# Patient Record
Sex: FEMALE
[De-identification: names, ages, dates, MRNs, and addresses within clinical notes are randomized per-mention and may not be internally consistent; named-entity substitution may affect disease eponyms.]

---

## 2017-08-23 ENCOUNTER — HOSPITAL ENCOUNTER (OUTPATIENT)
Dept: HOSPITAL 53 - M SMT | Age: 37
End: 2017-08-23
Attending: ADVANCED PRACTICE MIDWIFE
Payer: OTHER GOVERNMENT

## 2017-08-23 DIAGNOSIS — Z34.82: Primary | ICD-10-CM

## 2017-08-23 DIAGNOSIS — Z3A.00: ICD-10-CM

## 2017-08-23 DIAGNOSIS — Z36: ICD-10-CM

## 2017-08-23 LAB
ALT SERPL W P-5'-P-CCNC: 21 U/L (ref 12–78)
AST SERPL-CCNC: 11 U/L (ref 15–37)
BASOPHILS # BLD AUTO: 0.1 K/MM3 (ref 0–0.2)
BASOPHILS NFR BLD AUTO: 0.7 % (ref 0–1)
BILIRUB SERPL-MCNC: 0.2 MG/DL (ref 0.2–1)
CREAT SERPL-MCNC: 0.57 MG/DL (ref 0.55–1.02)
EOSINOPHIL # BLD AUTO: 0.9 K/MM3 (ref 0–0.5)
EOSINOPHIL NFR BLD AUTO: 9.6 % (ref 0–3)
ERYTHROCYTE [DISTWIDTH] IN BLOOD BY AUTOMATED COUNT: 13.9 % (ref 11.5–14.5)
GFR SERPL CREATININE-BSD FRML MDRD: > 60 ML/MIN/{1.73_M2} (ref 60–?)
LARGE UNSTAINED CELL #: 0.2 K/MM3 (ref 0–0.4)
LARGE UNSTAINED CELL %: 2.1 % (ref 0–4)
LYMPHOCYTES # BLD AUTO: 2.3 K/MM3 (ref 1.5–4.5)
LYMPHOCYTES NFR BLD AUTO: 25.8 % (ref 24–44)
MCH RBC QN AUTO: 27.1 PG (ref 27–33)
MCHC RBC AUTO-ENTMCNC: 32.6 G/DL (ref 32–36.5)
MCV RBC AUTO: 83.2 FL (ref 80–96)
MONOCYTES # BLD AUTO: 0.4 K/MM3 (ref 0–0.8)
MONOCYTES NFR BLD AUTO: 4.6 % (ref 0–5)
NEUTROPHILS # BLD AUTO: 5.2 K/MM3 (ref 1.8–7.7)
NEUTROPHILS NFR BLD AUTO: 57.2 % (ref 36–66)
PLATELET # BLD AUTO: 417 K/MM3 (ref 150–450)
URATE SERPL-MCNC: 3.3 MG/DL (ref 2.6–6)
WBC # BLD AUTO: 9 K/MM3 (ref 4–10)

## 2017-09-13 ENCOUNTER — HOSPITAL ENCOUNTER (OUTPATIENT)
Dept: HOSPITAL 53 - M RAD | Age: 37
End: 2017-09-13
Attending: ADVANCED PRACTICE MIDWIFE
Payer: OTHER GOVERNMENT

## 2017-09-13 DIAGNOSIS — Z36: ICD-10-CM

## 2017-09-13 DIAGNOSIS — Z3A.19: ICD-10-CM

## 2017-09-13 NOTE — REP
Clinical:  Anatomical evaluation.

 

Comparison: None .

 

Findings:

Examination demonstrates a single live intrauterine pregnancy in breech

presentation.  Fetal motion is identified by technologist.  Placenta is noted

anterior fundal and grade zero without evidence for placenta previa or abruption.

Amniotic fluid volume is normal.  Cervix measures 3.8 cm in length and appears

closed.  No evidence for nuchal cord.

 

Gestational age by LMP 19 weeks 4-day with PAULA 02/03/2018 .

Gestational age by current measurements 19 weeks 5 days with PAULA 02/02/2018 .

 

FHR equals 153 beats per minute.

BPD  4.5 cm     19 weeks 3 days

HC  17.3 cm     19 weeks 6 days

AC  14.7 cm     20 weeks 0 days

FL  3.2 cm      19 weeks 6 days

HL  2.9 cm      19 weeks 4 days

HC/AC ratio  1.18

 

Estimated fetal weight 320 grams ( 57th percentile).

 

Anatomical assessment demonstrates normal structures including cranium, choroid

plexus, cavum, cerebellum/posterior fossa, facial profile, lungs, four-chamber

heart/ left ventricular outflow tract, diaphragm, stomach, cord

insertion/three-vessel cord, kidneys/bladder, spine, and extremities.

 

Limited evaluation of the nose and lips and right cardiac ventricular outflow

tract as well as mild subjective renal pelviectasis which is within normal

range.

 

Impression:

Single live intrauterine pregnancy in breech presentation demonstrating

appropriate interval growth.  Anatomical limitations as described above.  The

remainder of the anatomical assessment is complete and normal.

 

 

Signed by

Fran Mclain MD 09/13/2017 02:48 P

## 2017-10-02 ENCOUNTER — HOSPITAL ENCOUNTER (OUTPATIENT)
Dept: HOSPITAL 53 - M RAD | Age: 37
End: 2017-10-02
Attending: ADVANCED PRACTICE MIDWIFE
Payer: OTHER GOVERNMENT

## 2017-10-02 DIAGNOSIS — Z36.2: Primary | ICD-10-CM

## 2017-10-02 NOTE — REP
Obstetric sonography:

 

History:  Supervision of pregnancy, followup anatomy.

 

Findings:  Scanning through the gravid uterus demonstrates a viable single

intrauterine gestation in a transverse head to the maternal left fetal lie.

Fetal motion is observed and heart rate is recorded at 147 beats per minute.  An

anterior grade 1 placenta is seen without evidence of previa or abruption.

Amniotic fluid is subjectively normal.  Closed cervical length is 6.1 cm.  This

is measured transabdominally.  There has been appropriate interval growth.  The

umbilical cord is seen draping across the fetal shoulders and neck region.

 

No fetal abnormality is noted.  Cross section of the umbilical cord is less than

optimally seen today.  Fetal spine is less than optimally seen today due to fetal

position, but these structures were identified previously.  The following

additional fetal anatomic structures are identified today and felt to be

sonographically unremarkable:  Fetal cranium, cavum, cerebellum and posterior

fossa, face and profile, lungs, four-chamber heart with left and right

ventricular outflow tract views, diaphragm, left-sided stomach, abdominal wall

cord insertion, kidneys and bladder, upper and lower extremities.

 

Biometry chart:

 

BPD 5.3 cm = 22 weeks 0 days

 

HC 20.5 cm = 22 weeks 4 days

 

AC 18.5 cm = 23 weeks 2 days

 

FL 3.9 cm = 22 weeks 3 days

 

HL 3.5 cm = 22 weeks 0 days

 

HC/AC ratio normal 1.11.

 

Cephalic index normal 0.70.

 

Estimated fetal weight 539 grams 1 pound 2 ounces 62nd percentile for 22 weeks 2

days.

 

Impression:

 

Viable single intrauterine gestation at 22 weeks 3 days by today's composite

criteria.  Expected gestational age estimate based on prior sonography is also 22

weeks 3 days.  PAULA by prior sonography February 2, 2018.  In combination with the

previous study, fetal anatomic survey is felt to be complete.

 

 

Signed by

Kaden Gonzalez MD 10/03/2017 09:29 A

## 2017-10-05 ENCOUNTER — HOSPITAL ENCOUNTER (OUTPATIENT)
Dept: HOSPITAL 53 - M LDO | Age: 37
Discharge: HOME | End: 2017-10-05
Attending: OBSTETRICS & GYNECOLOGY
Payer: OTHER GOVERNMENT

## 2017-10-05 VITALS — SYSTOLIC BLOOD PRESSURE: 116 MMHG | DIASTOLIC BLOOD PRESSURE: 65 MMHG

## 2017-10-05 VITALS — WEIGHT: 172.4 LBS | BODY MASS INDEX: 30.55 KG/M2 | HEIGHT: 63 IN

## 2017-10-05 VITALS — SYSTOLIC BLOOD PRESSURE: 107 MMHG | DIASTOLIC BLOOD PRESSURE: 68 MMHG

## 2017-10-05 VITALS — SYSTOLIC BLOOD PRESSURE: 127 MMHG | DIASTOLIC BLOOD PRESSURE: 78 MMHG

## 2017-10-05 DIAGNOSIS — O99.89: Primary | ICD-10-CM
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
## 2017-10-05 DIAGNOSIS — Z3A.22: ICD-10-CM

## 2017-11-15 ENCOUNTER — HOSPITAL ENCOUNTER (OUTPATIENT)
Dept: HOSPITAL 53 - M SMT | Age: 37
End: 2017-11-15
Attending: ADVANCED PRACTICE MIDWIFE
Payer: OTHER GOVERNMENT

## 2017-11-15 DIAGNOSIS — O09.513: Primary | ICD-10-CM

## 2017-11-15 LAB
BASOPHILS # BLD AUTO: 0.1 10^3/UL (ref 0–0.2)
BASOPHILS NFR BLD AUTO: 0.9 % (ref 0–1)
EOSINOPHIL # BLD AUTO: 0.5 10^3/UL (ref 0–0.5)
EOSINOPHIL NFR BLD AUTO: 6.3 % (ref 0–3)
ERYTHROCYTE [DISTWIDTH] IN BLOOD BY AUTOMATED COUNT: 14.6 % (ref 11.5–14.5)
IMM GRANULOCYTES NFR BLD: 0.9 % (ref 0–0)
LYMPHOCYTES # BLD AUTO: 2.3 10^3/UL (ref 1.5–4.5)
LYMPHOCYTES NFR BLD AUTO: 26.6 % (ref 24–44)
MCH RBC QN AUTO: 25.1 PG (ref 27–33)
MCHC RBC AUTO-ENTMCNC: 31.8 G/DL (ref 32–36.5)
MCV RBC AUTO: 78.9 FL (ref 80–96)
MONOCYTES # BLD AUTO: 0.5 10^3/UL (ref 0–0.8)
MONOCYTES NFR BLD AUTO: 6.2 % (ref 0–5)
NEUTROPHILS # BLD AUTO: 5 10^3/UL (ref 1.8–7.7)
NEUTROPHILS NFR BLD AUTO: 59.1 % (ref 36–66)
NRBC BLD AUTO-RTO: 0 % (ref 0–0)
PLATELET # BLD AUTO: 380 10^3/UL (ref 150–450)
WBC # BLD AUTO: 8.5 10^3/UL (ref 4–10)

## 2017-11-24 ENCOUNTER — HOSPITAL ENCOUNTER (OUTPATIENT)
Dept: HOSPITAL 53 - M LAB | Age: 37
End: 2017-11-24
Attending: ADVANCED PRACTICE MIDWIFE
Payer: OTHER GOVERNMENT

## 2017-11-24 DIAGNOSIS — O09.523: Primary | ICD-10-CM

## 2017-11-29 ENCOUNTER — HOSPITAL ENCOUNTER (OUTPATIENT)
Dept: HOSPITAL 53 - M LAB REF | Age: 37
End: 2017-11-29
Attending: ADVANCED PRACTICE MIDWIFE
Payer: OTHER GOVERNMENT

## 2017-11-29 DIAGNOSIS — O09.523: Primary | ICD-10-CM

## 2017-12-28 ENCOUNTER — HOSPITAL ENCOUNTER (OUTPATIENT)
Dept: HOSPITAL 53 - M SMT | Age: 37
End: 2017-12-28
Payer: OTHER GOVERNMENT

## 2017-12-28 DIAGNOSIS — O99.013: Primary | ICD-10-CM

## 2017-12-28 LAB
MEAN CORPUSCULAR HEMOGLOBIN: 23.1 PG (ref 27–33)
MEAN CORPUSCULAR HGB CONC: 31.1 G/DL (ref 32–36.5)
MEAN CORPUSCULAR VOLUME: 74.4 FL (ref 80–96)
NRBC BLD AUTO-RTO: 0 % (ref 0–0)
PLATELET COUNT, AUTOMATED: 400 10^3/UL (ref 150–450)
RED CELL DISTRIBUTION WIDTH: 15.8 % (ref 11.5–14.5)
WHITE BLOOD COUNT: 7.4 10^3/UL (ref 4–10)

## 2018-01-19 ENCOUNTER — HOSPITAL ENCOUNTER (INPATIENT)
Dept: HOSPITAL 53 - M LDO | Age: 38
LOS: 4 days | Discharge: HOME | End: 2018-01-23
Attending: OBSTETRICS & GYNECOLOGY | Admitting: OBSTETRICS & GYNECOLOGY
Payer: COMMERCIAL

## 2018-01-19 DIAGNOSIS — D64.9: ICD-10-CM

## 2018-01-19 DIAGNOSIS — Z3A.37: ICD-10-CM

## 2018-01-19 LAB
ALT SERPL W P-5'-P-CCNC: 13 U/L (ref 12–78)
AST SERPL-CCNC: 22 U/L (ref 7–37)
BILIRUBIN,TOTAL: 0.3 MG/DL (ref 0.2–1)
CREATININE FOR GFR: 0.45 MG/DL (ref 0.55–1.02)
CREATININE,RANDOM URINE: 48.2 MG/DL
GFR SERPL CREATININE-BSD FRML MDRD: > 60 ML/MIN/{1.73_M2} (ref 60–?)
HEMATOCRIT: 29.5 % (ref 36–47)
HEMOGLOBIN: 9.3 G/DL (ref 12–16)
LDH SERPL L TO P-CCNC: 270 U/L (ref 84–246)
MEAN CORPUSCULAR HEMOGLOBIN: 22 PG (ref 27–33)
MEAN CORPUSCULAR HGB CONC: 31.5 G/DL (ref 32–36.5)
MEAN CORPUSCULAR VOLUME: 69.9 FL (ref 80–96)
NRBC BLD AUTO-RTO: 0 % (ref 0–0)
PLATELET COUNT, AUTOMATED: 358 10^3/UL (ref 150–450)
RED BLOOD COUNT: 4.22 10^6/UL (ref 4–5.4)
RED CELL DISTRIBUTION WIDTH: 16.8 % (ref 11.5–14.5)
TOTAL PROTEIN,RANDOM URINE: 13.8 MG/DL (ref 0–12)
URIC ACID: 3.1 MG/DL (ref 2.6–6)
WHITE BLOOD COUNT: 8.3 10^3/UL (ref 4–10)

## 2018-01-19 RX ADMIN — DEXTROSE MONOHYDRATE 1 MLS/HR: 5 INJECTION INTRAVENOUS at 20:24

## 2018-01-19 RX ADMIN — SODIUM CHLORIDE, POTASSIUM CHLORIDE, SODIUM LACTATE AND CALCIUM CHLORIDE 1 ML: 600; 310; 30; 20 INJECTION, SOLUTION INTRAVENOUS at 20:24

## 2018-01-19 RX ADMIN — ACETAMINOPHEN 1 MG: 500 TABLET ORAL at 20:23

## 2018-01-19 RX ADMIN — MISOPROSTOL 1 MCG: 100 TABLET ORAL at 20:23

## 2018-01-20 LAB
CORD GAS ABE A: -5.1
CORD GAS ABE V: -3.6
CORD GAS HCO3 A: 21.1 MEQ/L
CORD GAS HCO3 V: 23.4 MEQ/L
CORD GAS O2 SAT A: 57.4 %
CORD GAS O2 SAT V: 62.6 %
CORD GAS PCO2 A: 42.9 MMHG
CORD GAS PCO2 V: 49.3 MMHG
CORD GAS PH A: 7.31 UNITS
CORD GAS PH V: 7.29 UNITS
CORD GAS PO2 A: 25.5 MMHG
CORD GAS PO2 V: 26.3 MMHG
CORD GAS SBC A: 19.4 MEQ/L
CORD GAS SBC V: 20.6 MEQ/L
CORD GAS TCO2 A: 22.4 MEQ/L
CORD GAS TCO2 V: 25 MEQ/L
HEMATOCRIT: 24.5 % (ref 36–47)
HEMOGLOBIN: 7.8 G/DL (ref 12–16)
IMMEDIATE SPIN CROSSMATCH: 1 3
MEAN CORPUSCULAR HEMOGLOBIN: 21.8 PG (ref 27–33)
MEAN CORPUSCULAR HGB CONC: 31.8 G/DL (ref 32–36.5)
MEAN CORPUSCULAR VOLUME: 68.6 FL (ref 80–96)
NRBC BLD AUTO-RTO: 0 % (ref 0–0)
PLATELET COUNT, AUTOMATED: 208 10^3/UL (ref 150–450)
RED BLOOD COUNT: 3.57 10^6/UL (ref 4–5.4)
RED CELL DISTRIBUTION WIDTH: 16.6 % (ref 11.5–14.5)
WHITE BLOOD COUNT: 7.8 10^3/UL (ref 4–10)

## 2018-01-20 PROCEDURE — 10907ZC DRAINAGE OF AMNIOTIC FLUID, THERAPEUTIC FROM PRODUCTS OF CONCEPTION, VIA NATURAL OR ARTIFICIAL OPENING: ICD-10-PCS

## 2018-01-20 PROCEDURE — 3E0P7GC INTRODUCTION OF OTHER THERAPEUTIC SUBSTANCE INTO FEMALE REPRODUCTIVE, VIA NATURAL OR ARTIFICIAL OPENING: ICD-10-PCS

## 2018-01-20 PROCEDURE — 30253N1: ICD-10-PCS

## 2018-01-20 RX ADMIN — KETOROLAC TROMETHAMINE 1 MG: 30 INJECTION, SOLUTION INTRAMUSCULAR at 22:49

## 2018-01-20 RX ADMIN — Medication 1 MLS/HR: at 13:24

## 2018-01-20 RX ADMIN — ONDANSETRON 1 MG: 2 INJECTION INTRAMUSCULAR; INTRAVENOUS at 10:13

## 2018-01-20 RX ADMIN — Medication 1 MLS/HR: at 00:30

## 2018-01-20 RX ADMIN — MISOPROSTOL 1 MCG: 200 TABLET ORAL at 21:16

## 2018-01-20 RX ADMIN — Medication 1 MLS/HR: at 00:42

## 2018-01-20 RX ADMIN — Medication 1 MLS/HR: at 09:01

## 2018-01-20 RX ADMIN — LOPERAMIDE HYDROCHLORIDE 1 MG: 2 TABLET, FILM COATED ORAL at 21:06

## 2018-01-20 RX ADMIN — Medication 1 MLS/HR: at 04:30

## 2018-01-20 RX ADMIN — DOCUSATE SODIUM 1 MG: 100 CAPSULE, LIQUID FILLED ORAL at 21:00

## 2018-01-20 RX ADMIN — SODIUM CHLORIDE, POTASSIUM CHLORIDE, SODIUM LACTATE AND CALCIUM CHLORIDE 1 MLS/HR: 600; 310; 30; 20 INJECTION, SOLUTION INTRAVENOUS at 21:06

## 2018-01-21 LAB
HEMATOCRIT: 27.3 % (ref 36–47)
HEMOGLOBIN: 9 G/DL (ref 12–16)
MEAN CORPUSCULAR HEMOGLOBIN: 23.7 PG (ref 27–33)
MEAN CORPUSCULAR HGB CONC: 33 G/DL (ref 32–36.5)
MEAN CORPUSCULAR VOLUME: 72 FL (ref 80–96)
NRBC BLD AUTO-RTO: 0 % (ref 0–0)
PLATELET COUNT, AUTOMATED: 245 10^3/UL (ref 150–450)
RED BLOOD COUNT: 3.79 10^6/UL (ref 4–5.4)
RED CELL DISTRIBUTION WIDTH: 17.7 % (ref 11.5–14.5)
WHITE BLOOD COUNT: 15.5 10^3/UL (ref 4–10)

## 2018-01-21 RX ADMIN — DOCUSATE SODIUM 1 MG: 100 CAPSULE, LIQUID FILLED ORAL at 09:55

## 2018-01-21 RX ADMIN — KETOROLAC TROMETHAMINE 1 MG: 30 INJECTION, SOLUTION INTRAMUSCULAR at 17:12

## 2018-01-21 RX ADMIN — KETOROLAC TROMETHAMINE 1 MG: 30 INJECTION, SOLUTION INTRAMUSCULAR at 11:13

## 2018-01-21 RX ADMIN — KETOROLAC TROMETHAMINE 1 MG: 30 INJECTION, SOLUTION INTRAMUSCULAR at 05:12

## 2018-01-21 RX ADMIN — NALBUPHINE HYDROCHLORIDE 1 MG: 10 INJECTION, SOLUTION INTRAMUSCULAR; INTRAVENOUS; SUBCUTANEOUS at 01:21

## 2018-01-21 RX ADMIN — DOCUSATE SODIUM 1 MG: 100 CAPSULE, LIQUID FILLED ORAL at 19:39

## 2018-01-21 RX ADMIN — SODIUM CHLORIDE, POTASSIUM CHLORIDE, SODIUM LACTATE AND CALCIUM CHLORIDE 1 MLS/HR: 600; 310; 30; 20 INJECTION, SOLUTION INTRAVENOUS at 08:18

## 2018-01-21 RX ADMIN — SODIUM CHLORIDE, POTASSIUM CHLORIDE, SODIUM LACTATE AND CALCIUM CHLORIDE 1 MLS/HR: 600; 310; 30; 20 INJECTION, SOLUTION INTRAVENOUS at 01:20

## 2018-01-21 RX ADMIN — Medication 1 TAB: at 09:55

## 2018-01-22 LAB — SYPHILIS: NONREACTIVE

## 2018-01-22 RX ADMIN — Medication 1 TAB: at 08:34

## 2018-01-22 RX ADMIN — IBUPROFEN 1 MG: 800 TABLET, FILM COATED ORAL at 17:05

## 2018-01-22 RX ADMIN — IBUPROFEN 1 MG: 800 TABLET, FILM COATED ORAL at 08:34

## 2018-01-22 RX ADMIN — DOCUSATE SODIUM 1 MG: 100 CAPSULE, LIQUID FILLED ORAL at 21:29

## 2018-01-22 RX ADMIN — IBUPROFEN 1 MG: 800 TABLET, FILM COATED ORAL at 00:37

## 2018-01-22 RX ADMIN — DOCUSATE SODIUM 1 MG: 100 CAPSULE, LIQUID FILLED ORAL at 08:34

## 2018-01-23 RX ADMIN — Medication 1 TAB: at 09:48

## 2018-01-23 RX ADMIN — DOCUSATE SODIUM 1 MG: 100 CAPSULE, LIQUID FILLED ORAL at 09:47

## 2018-01-23 RX ADMIN — IBUPROFEN 1 MG: 800 TABLET, FILM COATED ORAL at 09:49

## 2018-01-23 RX ADMIN — IBUPROFEN 1 MG: 800 TABLET, FILM COATED ORAL at 00:58

## 2018-11-12 ENCOUNTER — HOSPITAL ENCOUNTER (EMERGENCY)
Dept: HOSPITAL 53 - M ED | Age: 38
Discharge: HOME | End: 2018-11-12
Payer: COMMERCIAL

## 2018-11-12 ENCOUNTER — HOSPITAL ENCOUNTER (EMERGENCY)
Dept: HOSPITAL 53 - M ED | Age: 38
LOS: 1 days | Discharge: HOME | End: 2018-11-13
Payer: COMMERCIAL

## 2018-11-12 DIAGNOSIS — R11.2: ICD-10-CM

## 2018-11-12 DIAGNOSIS — I10: ICD-10-CM

## 2018-11-12 DIAGNOSIS — K59.00: Primary | ICD-10-CM

## 2018-11-12 DIAGNOSIS — Z72.0: ICD-10-CM

## 2018-11-12 DIAGNOSIS — J45.909: ICD-10-CM

## 2018-11-12 DIAGNOSIS — R14.0: ICD-10-CM

## 2018-11-12 DIAGNOSIS — R10.10: Primary | ICD-10-CM

## 2018-11-12 DIAGNOSIS — Z79.899: ICD-10-CM

## 2018-11-12 LAB
ALBUMIN/GLOBULIN RATIO: 1.11 (ref 1–1.93)
ALBUMIN: 4 GM/DL (ref 3.2–5.2)
ALKALINE PHOSPHATASE: 141 U/L (ref 45–117)
ALT SERPL W P-5'-P-CCNC: 143 U/L (ref 12–78)
AMYLASE SERPL-CCNC: 51 U/L (ref 25–115)
ANION GAP: 8 MEQ/L (ref 8–16)
APPEARANCE, URINE: CLEAR
AST SERPL-CCNC: 77 U/L (ref 7–37)
BACTERIA UR QL AUTO: NEGATIVE
BASO #: 0.1 10^3/UL (ref 0–0.2)
BASO %: 1.4 % (ref 0–1)
BILIRUBIN, URINE AUTO: NEGATIVE
BILIRUBIN,TOTAL: 0.4 MG/DL (ref 0.2–1)
BLOOD UREA NITROGEN: 10 MG/DL (ref 7–18)
BLOOD, URINE BLOOD: NEGATIVE
CALCIUM LEVEL: 8.6 MG/DL (ref 8.5–10.1)
CARBON DIOXIDE LEVEL: 26 MEQ/L (ref 21–32)
CHLORIDE LEVEL: 106 MEQ/L (ref 98–107)
CREATININE FOR GFR: 0.7 MG/DL (ref 0.55–1.3)
CRP SERPL-MCNC: < 0.3 MG/DL (ref 0–0.3)
EOS #: 0.6 10^3/UL (ref 0–0.5)
EOSINOPHIL NFR BLD AUTO: 8.8 % (ref 0–3)
GFR SERPL CREATININE-BSD FRML MDRD: > 60 ML/MIN/{1.73_M2} (ref 60–?)
GLUCOSE, FASTING: 97 MG/DL (ref 70–100)
GLUCOSE, URINE (UA) AUTO: NEGATIVE MG/DL
HEMATOCRIT: 39.5 % (ref 36–47)
HEMOGLOBIN: 12.8 G/DL (ref 12–15.5)
IMMATURE GRANULOCYTE %: 0.2 % (ref 0–3)
KETONE, URINE AUTO: (no result) MG/DL
LACTIC ACID SEPSIS PROTOCOL: 1.8 MMOL/L (ref 0.4–2)
LEUKOCYTE ESTERASE UR QL STRIP.AUTO: (no result)
LIPASE: 87 U/L (ref 73–393)
LYMPH #: 2.7 10^3/UL (ref 1.5–4.5)
LYMPH %: 43.2 % (ref 24–44)
MEAN CORPUSCULAR HEMOGLOBIN: 26.4 PG (ref 27–33)
MEAN CORPUSCULAR HGB CONC: 32.4 G/DL (ref 32–36.5)
MEAN CORPUSCULAR VOLUME: 81.4 FL (ref 80–96)
MONO #: 0.4 10^3/UL (ref 0–0.8)
MONO %: 6.2 % (ref 0–5)
MUCUS, URINE: (no result)
NEUTROPHILS #: 2.5 10^3/UL (ref 1.8–7.7)
NEUTROPHILS %: 40.2 % (ref 36–66)
NITRITE, URINE AUTO: NEGATIVE
NRBC BLD AUTO-RTO: 0 % (ref 0–0)
PH,URINE: 5 UNITS (ref 5–9)
PLATELET COUNT, AUTOMATED: 359 10^3/UL (ref 150–450)
POTASSIUM SERUM: 4.3 MEQ/L (ref 3.5–5.1)
PROT UR QL STRIP.AUTO: NEGATIVE MG/DL
RBC, URINE AUTO: 1 /HPF (ref 0–3)
RED BLOOD COUNT: 4.85 10^6/UL (ref 4–5.4)
RED CELL DISTRIBUTION WIDTH: 14.8 % (ref 11.5–14.5)
SODIUM LEVEL: 140 MEQ/L (ref 136–145)
SPECIFIC GRAVITY URINE AUTO: 1.02 (ref 1–1.03)
SQUAMOUS #/AREA URNS AUTO: 3 /HPF (ref 0–6)
TOTAL PROTEIN: 7.6 GM/DL (ref 6.4–8.2)
UROBILINOGEN, URINE AUTO: 2 MG/DL (ref 0–2)
WBC, URINE AUTO: 4 /HPF (ref 0–3)
WHITE BLOOD COUNT: 6.3 10^3/UL (ref 4–10)

## 2018-11-12 RX ADMIN — KETOROLAC TROMETHAMINE 1 MG: 30 INJECTION, SOLUTION INTRAMUSCULAR at 08:47

## 2018-11-12 RX ADMIN — MORPHINE SULFATE 1 MG: 4 INJECTION INTRAVENOUS at 10:00

## 2018-11-12 RX ADMIN — DIATRIZOATE MEGLUMINE AND DIATRIZOATE SODIUM 1 ML: 600; 100 SOLUTION ORAL; RECTAL at 09:18

## 2018-11-12 RX ADMIN — SODIUM CHLORIDE 1 MLS/HR: 9 INJECTION, SOLUTION INTRAVENOUS at 08:46

## 2018-11-12 RX ADMIN — ONDANSETRON 1 MG: 2 INJECTION INTRAMUSCULAR; INTRAVENOUS at 08:46

## 2018-11-12 RX ADMIN — DIATRIZOATE MEGLUMINE AND DIATRIZOATE SODIUM 1 ML: 600; 100 SOLUTION ORAL; RECTAL at 10:00

## 2018-11-13 LAB
ALBUMIN/GLOBULIN RATIO: 1.12 (ref 1–1.93)
ALBUMIN: 3.8 GM/DL (ref 3.2–5.2)
ALKALINE PHOSPHATASE: 117 U/L (ref 45–117)
ALT SERPL W P-5'-P-CCNC: 150 U/L (ref 12–78)
ANION GAP: 5 MEQ/L (ref 8–16)
AST SERPL-CCNC: 87 U/L (ref 7–37)
BASO #: 0.1 10^3/UL (ref 0–0.2)
BASO %: 1.3 % (ref 0–1)
BILIRUBIN,TOTAL: 0.4 MG/DL (ref 0.2–1)
BLOOD UREA NITROGEN: 10 MG/DL (ref 7–18)
CALCIUM LEVEL: 8.5 MG/DL (ref 8.5–10.1)
CARBON DIOXIDE LEVEL: 27 MEQ/L (ref 21–32)
CHLORIDE LEVEL: 106 MEQ/L (ref 98–107)
CREATININE FOR GFR: 0.68 MG/DL (ref 0.55–1.3)
EOS #: 0.6 10^3/UL (ref 0–0.5)
EOSINOPHIL NFR BLD AUTO: 6.4 % (ref 0–3)
GFR SERPL CREATININE-BSD FRML MDRD: > 60 ML/MIN/{1.73_M2} (ref 60–?)
GLUCOSE, FASTING: 98 MG/DL (ref 70–100)
HEMATOCRIT: 37 % (ref 36–47)
HEMOGLOBIN: 11.9 G/DL (ref 12–15.5)
IMMATURE GRANULOCYTE %: 0.2 % (ref 0–3)
LIPASE: 104 U/L (ref 73–393)
LYMPH #: 2.5 10^3/UL (ref 1.5–4.5)
LYMPH %: 29 % (ref 24–44)
MEAN CORPUSCULAR HEMOGLOBIN: 26.3 PG (ref 27–33)
MEAN CORPUSCULAR HGB CONC: 32.2 G/DL (ref 32–36.5)
MEAN CORPUSCULAR VOLUME: 81.7 FL (ref 80–96)
MONO #: 0.6 10^3/UL (ref 0–0.8)
MONO %: 6.6 % (ref 0–5)
NEUTROPHILS #: 4.9 10^3/UL (ref 1.8–7.7)
NEUTROPHILS %: 56.5 % (ref 36–66)
NRBC BLD AUTO-RTO: 0 % (ref 0–0)
PLATELET COUNT, AUTOMATED: 326 10^3/UL (ref 150–450)
POTASSIUM SERUM: 4.5 MEQ/L (ref 3.5–5.1)
RED BLOOD COUNT: 4.53 10^6/UL (ref 4–5.4)
RED CELL DISTRIBUTION WIDTH: 14.8 % (ref 11.5–14.5)
SODIUM LEVEL: 138 MEQ/L (ref 136–145)
TOTAL PROTEIN: 7.2 GM/DL (ref 6.4–8.2)
WHITE BLOOD COUNT: 8.8 10^3/UL (ref 4–10)

## 2018-11-13 RX ADMIN — SIMETHICONE 1 MG: 80 TABLET, CHEWABLE ORAL at 00:20

## 2018-11-13 RX ADMIN — MAGNESIUM CITRATE 1 ML: 1.75 LIQUID ORAL at 01:09

## 2018-11-13 RX ADMIN — ONDANSETRON 1 MG: 2 INJECTION INTRAMUSCULAR; INTRAVENOUS at 00:21

## 2018-11-13 RX ADMIN — KETOROLAC TROMETHAMINE 1 MG: 30 INJECTION, SOLUTION INTRAMUSCULAR at 01:08

## 2018-11-14 LAB
HCV AB SER QL: 0.1 INDEX (ref ?–0.8)
HEPATITIS A ANTIBODY IGM: NEGATIVE
HEPATITIS B CORE ANTIBODY IGM: NEGATIVE
HEPATITIS B SURFACE ANTIGEN: NEGATIVE